# Patient Record
Sex: MALE | Race: WHITE | NOT HISPANIC OR LATINO | URBAN - NONMETROPOLITAN AREA
[De-identification: names, ages, dates, MRNs, and addresses within clinical notes are randomized per-mention and may not be internally consistent; named-entity substitution may affect disease eponyms.]

---

## 2017-02-13 PROBLEM — H52.4: Noted: 2017-02-13

## 2019-02-08 ENCOUNTER — IMPORTED ENCOUNTER (OUTPATIENT)
Dept: URBAN - NONMETROPOLITAN AREA CLINIC 1 | Facility: CLINIC | Age: 69
End: 2019-02-08

## 2019-02-08 PROCEDURE — 99214 OFFICE O/P EST MOD 30 MIN: CPT

## 2019-02-08 PROCEDURE — 92083 EXTENDED VISUAL FIELD XM: CPT

## 2019-02-08 NOTE — PATIENT DISCUSSION
POAG OU Mild Discussed diagnosis in detail with patientDiscussed the chronic progressive nature of this disease and various treatment optionsImportance of good compliance with medications was emphasizedVF done today; stable with non-specific defects OU IOP today at 14 OD and 16 OSC/D Ratio: 0.7 OUContinue Latanoprost OU and Cosopt BID OUWill continue to monitorRTC 3 months with Gonio Maculopathy OU secondary to weldingDiscussed diagnosis with patientContinue to monitor closelyPseudophakia OUBoth intraocular lenses in place and stableContinue to monitor Presbyopia OUDiscussed refractive status in detail with patientContinue to monitor; 's Notes: MR 02/13/17DFE 7/27/18OCT 7/27/18Pach 3/4/15Gonio 5/21/18Optos 11/5/18VF 2/8/19

## 2019-05-10 ENCOUNTER — IMPORTED ENCOUNTER (OUTPATIENT)
Dept: URBAN - NONMETROPOLITAN AREA CLINIC 1 | Facility: CLINIC | Age: 69
End: 2019-05-10

## 2019-05-10 PROCEDURE — 99214 OFFICE O/P EST MOD 30 MIN: CPT

## 2019-05-10 PROCEDURE — 92020 GONIOSCOPY: CPT

## 2019-05-10 NOTE — PATIENT DISCUSSION
POAG OU Mild Discussed diagnosis in detail with patient. Discussed the chronic progressive nature of this disease and various treatment options. Importance of good compliance with medications was emphasized. Gonio done today; Grade IV with light pigment OU. IOP today at 13 OU. C/D Ratio: 0.7 OU. Continue Latanoprost OU and Cosopt BID OU. Will continue to monitor. RTC 3 months with OCT Maculopathy OU secondary to weldingDiscussed diagnosis with patient. Continue to monitor closely. Pseudophakia OUDiscussed diagnosis in detail with patient. Both intraocular lenses in place and stable. Continue to monitor. Presbyopia OUDiscussed refractive status in detail with patient. Continue to monitor.; 's Notes: MR 02/13/17DFE 7/27/18OCT 7/27/18Pach 3/4/15Gonio 5/10/19Optos 11/5/18VF 2/8/19

## 2019-08-12 ENCOUNTER — IMPORTED ENCOUNTER (OUTPATIENT)
Dept: URBAN - NONMETROPOLITAN AREA CLINIC 1 | Facility: CLINIC | Age: 69
End: 2019-08-12

## 2019-08-12 PROBLEM — H26.493: Noted: 2019-08-12

## 2019-08-12 PROBLEM — H52.4: Noted: 2019-08-12

## 2019-08-12 PROCEDURE — 92133 CPTRZD OPH DX IMG PST SGM ON: CPT

## 2019-08-12 PROCEDURE — 99214 OFFICE O/P EST MOD 30 MIN: CPT

## 2019-08-12 NOTE — PATIENT DISCUSSION
POAG OU Mild Discussed diagnosis in detail with patient. Discussed the chronic progressive nature of this disease and various treatment options. Importance of good compliance with medications was emphasized. OCT done today; Superior [employer] thinning OD and No NFL thinning OS. IOP today at 13 OD and 14 OS. C/D Ratio: 0.7 OU. Continue Latanoprost OU and Cosopt BID OU. Will continue to monitor. Maculopathy OU secondary to weldingDiscussed diagnosis with patient. Continue to monitor closely. Pseudophakia OUDiscussed diagnosis in detail with patient. Both intraocular lenses in place and stable. PCO noted OS>OD Recommend refer to Dr. Whitney Chandra for evaluation. Continue to monitor. Presbyopia OUDiscussed refractive status in detail with patient. Continue to monitor.; 's Notes:  02/13/17DFE 7/27/18OCT 7/27/18Pach 3/4/15Gonio 5/10/19Optos 11/5/18VF 2/8/19

## 2019-09-18 ENCOUNTER — IMPORTED ENCOUNTER (OUTPATIENT)
Dept: URBAN - NONMETROPOLITAN AREA CLINIC 1 | Facility: CLINIC | Age: 69
End: 2019-09-18

## 2019-09-18 PROBLEM — H52.4: Noted: 2019-08-12

## 2019-09-18 PROBLEM — H40.1131: Noted: 2019-09-18

## 2019-09-18 PROBLEM — H26.493: Noted: 2019-09-18

## 2019-09-18 PROBLEM — H31.023: Noted: 2019-09-18

## 2019-09-18 PROBLEM — Z96.1: Noted: 2019-09-18

## 2019-09-18 PROCEDURE — 92014 COMPRE OPH EXAM EST PT 1/>: CPT

## 2019-09-18 PROCEDURE — 66821 AFTER CATARACT LASER SURGERY: CPT

## 2019-09-18 NOTE — PATIENT DISCUSSION
PCO-Explained PCO and RBAs of YAG Capsulotomy to pt. -Pt elects to proceed. YAG Caps OS today and YAG Caps OD in 1-2 weeks.-Discussed realistic visual outcome secondary to macular issues patient states understanding. -He is seeing Judith Mcgraw for correction s/p YAG PC OU. --------------------------------------------------------------------------------------POAG OU Mild Discussed diagnosis in detail with patient. Discussed the chronic progressive nature of this disease and various treatment options. Importance of good compliance with medications was emphasized. OCT done; Superior NFL thinning OD and No NFL thinning OS. IOP today at 13 OD and 14 OS. C/D Ratio: 0.7 OU. Continue Latanoprost OU and Cosopt BID OU. Will continue to monitor. Maculopathy OU secondary to weldingDiscussed diagnosis with patient. Continue to monitor closely. Pseudophakia OUDiscussed diagnosis in detail with patient. Both intraocular lenses in place and stable. PCO noted OS>OD Recommend refer to Dr. Nikolas Rojas for evaluation. Continue to monitor. Presbyopia OUDiscussed refractive status in detail with patient. Continue to monitor.; 's Notes:  02/13/17DFE 7/27/18OCT 7/27/18Pach 3/4/15Gonio 5/10/19Optos 11/5/18VF 2/8/19

## 2019-09-20 ENCOUNTER — IMPORTED ENCOUNTER (OUTPATIENT)
Dept: URBAN - NONMETROPOLITAN AREA CLINIC 1 | Facility: CLINIC | Age: 69
End: 2019-09-20

## 2019-09-20 PROCEDURE — 66821 AFTER CATARACT LASER SURGERY: CPT

## 2019-09-20 NOTE — PATIENT DISCUSSION
PCO-Explained PCO and RBAs of YAG Capsulotomy to pt. -Pt elects to proceed. YAG Caps OD  s/p YAG PC OS by Dr. Thom Martin on 9/18/19-Written constent signed and obtained today prior to procedure. -Patient is going to be followed by Dr. Dilshad Venegas s/p YAG  --------------------------------------------------------------------------------------POAG OU Mild Discussed diagnosis in detail with patient. Discussed the chronic progressive nature of this disease and various treatment options. Importance of good compliance with medications was emphasized. OCT done; Superior NFL thinning OD and No NFL thinning OS. IOP today at 13 OD and 14 OS. C/D Ratio: 0.7 OU. Continue Latanoprost OU and Cosopt BID OU. Will continue to monitor. Maculopathy OU secondary to weldingDiscussed diagnosis with patient. Continue to monitor closely. Pseudophakia OUDiscussed diagnosis in detail with patient. Both intraocular lenses in place and stable. PCO noted OS>OD Recommend refer to Dr. Thom Martin for evaluation. Continue to monitor. Presbyopia OUDiscussed refractive status in detail with patient. Continue to monitor.; 's Notes:  02/13/17DFE 7/27/18OCT 7/27/18Pach 3/4/15Gonio 5/10/19Optos 11/5/18VF 2/8/19

## 2020-02-10 ENCOUNTER — IMPORTED ENCOUNTER (OUTPATIENT)
Dept: URBAN - NONMETROPOLITAN AREA CLINIC 1 | Facility: CLINIC | Age: 70
End: 2020-02-10

## 2020-02-10 PROBLEM — Z96.1: Noted: 2020-02-10

## 2020-02-10 PROBLEM — H40.1131: Noted: 2020-02-10

## 2020-02-10 PROBLEM — H52.4: Noted: 2020-02-10

## 2020-02-10 PROBLEM — H31.023: Noted: 2020-02-10

## 2020-02-10 PROCEDURE — 92250 FUNDUS PHOTOGRAPHY W/I&R: CPT

## 2020-02-10 PROCEDURE — 99214 OFFICE O/P EST MOD 30 MIN: CPT

## 2020-02-10 NOTE — PATIENT DISCUSSION
POAG OU Mild Discussed diagnosis in detail with patient. Discussed the chronic progressive nature of this disease and various treatment options. Importance of good compliance with medications was emphasized. Optos done today; stable with glaucomatous cupping. IOP today at 14 OU. C/D Ratio: 0.7 OU. Continue Latanoprost OU and Cosopt BID OU. Will continue to monitor. RTC in 3 months with VF 24-2Maculopathy OU secondary to weldingDiscussed diagnosis with patient. Continue to monitor closely. Pseudophakia OUDiscussed diagnosis in detail with patient. Both intraocular lenses in place and stable with open capsules OU. Continue to monitor. Presbyopia OUDiscussed refractive status in detail with patient. Continue to monitor.; 's Notes:  02/13/17DFE 2/10/20OCT 8/12/19Pach 3/4/15Gonio 5/10/19Optos 2/10/20VF 2/8/19

## 2020-12-21 ENCOUNTER — IMPORTED ENCOUNTER (OUTPATIENT)
Dept: URBAN - NONMETROPOLITAN AREA CLINIC 1 | Facility: CLINIC | Age: 70
End: 2020-12-21

## 2020-12-21 PROCEDURE — 92133 CPTRZD OPH DX IMG PST SGM ON: CPT

## 2020-12-21 PROCEDURE — 99214 OFFICE O/P EST MOD 30 MIN: CPT

## 2020-12-21 NOTE — PATIENT DISCUSSION
POAG OU Mild Discussed diagnosis in detail with patient. Discussed the chronic progressive nature of this disease and various treatment options. Importance of good compliance with medications was emphasized. Recommend OCT today and shows superior NFL thinning OD and superior/inferior NFL thinning OS. IOP today at 14 OU. C/D Ratio: 0.7 OU. Continue Latanoprost OU and Cosopt BID OU. Will continue to monitor. RTC in 6 months with Optos and GonioMaculopathy OU secondary to weldingDiscussed diagnosis with patient. Continue to monitor closely. Pseudophakia OUDiscussed diagnosis in detail with patient. Both intraocular lenses in place and stable with open capsules OU. Continue to monitor. Presbyopia OUDiscussed refractive status in detail with patient. Continue to monitor.; 's Notes:  02/13/17DFE  12/21/20OCT 12/21/20Gonio 5/10/19Optos 2/10/20VF 2/8/19Pach 3/4/15

## 2021-06-25 ENCOUNTER — IMPORTED ENCOUNTER (OUTPATIENT)
Dept: URBAN - NONMETROPOLITAN AREA CLINIC 1 | Facility: CLINIC | Age: 71
End: 2021-06-25

## 2021-06-25 ENCOUNTER — PREPPED CHART (OUTPATIENT)
Dept: URBAN - NONMETROPOLITAN AREA CLINIC 1 | Facility: CLINIC | Age: 71
End: 2021-06-25

## 2021-06-25 PROCEDURE — 92250 FUNDUS PHOTOGRAPHY W/I&R: CPT

## 2021-06-25 PROCEDURE — 92020 GONIOSCOPY: CPT

## 2021-06-25 PROCEDURE — 99214 OFFICE O/P EST MOD 30 MIN: CPT

## 2021-06-25 NOTE — PATIENT DISCUSSION
POAG OU Mild Discussed diagnosis in detail with patient. Discussed the chronic progressive nature of this disease and various treatment options. Importance of good compliance with medications was emphasized. Optos done today; stable with glaucomatous cupping OU. Gonio done today; Grade IV with light pigment OU. IOP today at 14 OU. C/D Ratio: 0.7 OU. Continue Latanoprost OU and Cosopt BID OU Rx sent to pharmacy. Will continue to monitor. RTC in 6 months with VF 24-2 and OCT. Maculopathy OU secondary to weldingDiscussed diagnosis with patient. Continue to monitor closely. Pseudophakia OUDiscussed diagnosis in detail with patient. Both intraocular lenses in place and stable with open capsules OU. Continue to monitor. Presbyopia OUDiscussed refractive status in detail with patient. Continue to monitor.; 's Notes:  02/13/17DFE  12/21/20OCT 12/21/20GONIO   6/25/21OPTOS  6/25/21VF 2/8/19Pach 3/4/15

## 2022-03-23 ASSESSMENT — TONOMETRY
OS_IOP_MMHG: 14
OD_IOP_MMHG: 14

## 2022-03-23 ASSESSMENT — VISUAL ACUITY
OS_SC: 20/100
OD_SC: 20/100

## 2022-03-30 ENCOUNTER — FOLLOW UP (OUTPATIENT)
Dept: URBAN - NONMETROPOLITAN AREA CLINIC 1 | Facility: CLINIC | Age: 72
End: 2022-03-30

## 2022-03-30 DIAGNOSIS — H40.1131: ICD-10-CM

## 2022-03-30 PROCEDURE — 92133 CPTRZD OPH DX IMG PST SGM ON: CPT

## 2022-03-30 PROCEDURE — 99214 OFFICE O/P EST MOD 30 MIN: CPT

## 2022-03-30 ASSESSMENT — VISUAL ACUITY
OD_SC: 20/300
OS_SC: 20/200

## 2022-03-30 ASSESSMENT — TONOMETRY
OD_IOP_MMHG: 12
OS_IOP_MMHG: 11

## 2022-04-10 ASSESSMENT — VISUAL ACUITY
OD_CC: 20/125
OD_CC: 20/200+
OS_CC: 20/200
OD_CC: 20/200
OD_CC: 20/200
OS_CC: 20/200
OS_CC: 20/100
OS_CC: 20/100
OD_CC: 20/100
OS_CC: 20/200
OD_CC: 20/200
OD_CC: 20/200
OS_GLARE: 20/200
OS_CC: 20/63-
OS_CC: 20/200

## 2022-04-10 ASSESSMENT — PACHYMETRY
OD_CT_UM: 582; ADJ: THICK
OS_CT_UM: 592; ADJ: THICK
OD_CT_UM: 582; ADJ: THICK
OD_CT_UM: 582; ADJ: THICK
OS_CT_UM: 592; ADJ: THICK
OS_CT_UM: 592; ADJ: THICK
OD_CT_UM: 582; ADJ: THICK
OD_CT_UM: 582; ADJ: THICK
OS_CT_UM: 592; ADJ: THICK
OS_CT_UM: 592; ADJ: THICK
OD_CT_UM: 582; ADJ: THICK
OD_CT_UM: 582; ADJ: THICK
OS_CT_UM: 592; ADJ: THICK
OD_CT_UM: 582; ADJ: THICK
OS_CT_UM: 592; ADJ: THICK
OS_CT_UM: 592; ADJ: THICK

## 2022-04-10 ASSESSMENT — TONOMETRY
OS_IOP_MMHG: 14
OD_IOP_MMHG: 13
OS_IOP_MMHG: 14
OD_IOP_MMHG: 14
OD_IOP_MMHG: 14
OD_IOP_MMHG: 13
OS_IOP_MMHG: 13
OS_IOP_MMHG: 14
OD_IOP_MMHG: 14
OS_IOP_MMHG: 14
OD_IOP_MMHG: 14
OD_IOP_MMHG: 14
OS_IOP_MMHG: 16
OS_IOP_MMHG: 14
OS_IOP_MMHG: 14
OD_IOP_MMHG: 14

## 2023-04-28 ENCOUNTER — COMPREHENSIVE EXAM (OUTPATIENT)
Dept: URBAN - NONMETROPOLITAN AREA CLINIC 1 | Facility: CLINIC | Age: 73
End: 2023-04-28

## 2023-04-28 DIAGNOSIS — H52.4: ICD-10-CM

## 2023-04-28 DIAGNOSIS — H40.1131: ICD-10-CM

## 2023-04-28 PROCEDURE — 92250 FUNDUS PHOTOGRAPHY W/I&R: CPT

## 2023-04-28 PROCEDURE — 99214 OFFICE O/P EST MOD 30 MIN: CPT

## 2023-04-28 PROCEDURE — 92015 DETERMINE REFRACTIVE STATE: CPT

## 2023-04-28 ASSESSMENT — VISUAL ACUITY
OD_SC: 20/200
OS_SC: 20/150

## 2023-04-28 ASSESSMENT — TONOMETRY
OS_IOP_MMHG: 13
OD_IOP_MMHG: 14

## 2023-11-09 ENCOUNTER — COMPREHENSIVE EXAM (OUTPATIENT)
Dept: URBAN - NONMETROPOLITAN AREA CLINIC 1 | Facility: CLINIC | Age: 73
End: 2023-11-09

## 2023-11-09 DIAGNOSIS — H31.023: ICD-10-CM

## 2023-11-09 DIAGNOSIS — H40.1131: ICD-10-CM

## 2023-11-09 PROCEDURE — 99214 OFFICE O/P EST MOD 30 MIN: CPT

## 2023-11-09 PROCEDURE — 92133 CPTRZD OPH DX IMG PST SGM ON: CPT

## 2023-11-09 ASSESSMENT — VISUAL ACUITY
OD_SC: 20/150-1
OS_PH: 20/150-1
OS_SC: 20/200-1

## 2023-11-09 ASSESSMENT — TONOMETRY
OS_IOP_MMHG: 14
OD_IOP_MMHG: 14

## 2024-05-10 ENCOUNTER — ESTABLISHED PATIENT (OUTPATIENT)
Dept: URBAN - NONMETROPOLITAN AREA CLINIC 1 | Facility: CLINIC | Age: 74
End: 2024-05-10

## 2024-05-10 DIAGNOSIS — H40.1131: ICD-10-CM

## 2024-05-10 PROCEDURE — 92250 FUNDUS PHOTOGRAPHY W/I&R: CPT

## 2024-05-10 PROCEDURE — 99213 OFFICE O/P EST LOW 20 MIN: CPT

## 2024-05-10 PROCEDURE — 92020 GONIOSCOPY: CPT

## 2024-05-10 ASSESSMENT — TONOMETRY
OS_IOP_MMHG: 13
OD_IOP_MMHG: 13

## 2024-05-10 ASSESSMENT — VISUAL ACUITY
OD_SC: 20/200
OS_SC: 20/125-1
OS_PH: 20/100

## 2024-11-15 ENCOUNTER — FOLLOW UP (OUTPATIENT)
Dept: URBAN - NONMETROPOLITAN AREA CLINIC 1 | Facility: CLINIC | Age: 74
End: 2024-11-15

## 2024-11-15 DIAGNOSIS — H31.023: ICD-10-CM

## 2024-11-15 DIAGNOSIS — H40.1131: ICD-10-CM

## 2024-11-15 DIAGNOSIS — Z96.1: ICD-10-CM

## 2024-11-15 PROCEDURE — 92083 EXTENDED VISUAL FIELD XM: CPT

## 2024-11-15 PROCEDURE — 99214 OFFICE O/P EST MOD 30 MIN: CPT

## 2025-06-09 ENCOUNTER — FOLLOW UP (OUTPATIENT)
Age: 75
End: 2025-06-09

## 2025-06-09 DIAGNOSIS — H31.023: ICD-10-CM

## 2025-06-09 DIAGNOSIS — Z96.1: ICD-10-CM

## 2025-06-09 DIAGNOSIS — H40.1131: ICD-10-CM

## 2025-06-09 PROCEDURE — 92250 FUNDUS PHOTOGRAPHY W/I&R: CPT

## 2025-06-09 PROCEDURE — 99214 OFFICE O/P EST MOD 30 MIN: CPT
